# Patient Record
Sex: FEMALE | Race: WHITE | NOT HISPANIC OR LATINO | Employment: OTHER | URBAN - METROPOLITAN AREA
[De-identification: names, ages, dates, MRNs, and addresses within clinical notes are randomized per-mention and may not be internally consistent; named-entity substitution may affect disease eponyms.]

---
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
## 2021-04-17 ENCOUNTER — HEALTH MAINTENANCE LETTER (OUTPATIENT)
Age: 21
End: 2021-04-17

## 2021-10-02 ENCOUNTER — HEALTH MAINTENANCE LETTER (OUTPATIENT)
Age: 21
End: 2021-10-02

## 2022-01-13 ENCOUNTER — OFFICE VISIT (OUTPATIENT)
Dept: FAMILY MEDICINE | Facility: CLINIC | Age: 22
End: 2022-01-13
Payer: COMMERCIAL

## 2022-01-13 VITALS
HEART RATE: 67 BPM | DIASTOLIC BLOOD PRESSURE: 76 MMHG | TEMPERATURE: 97.6 F | BODY MASS INDEX: 30.4 KG/M2 | WEIGHT: 161 LBS | SYSTOLIC BLOOD PRESSURE: 120 MMHG | RESPIRATION RATE: 12 BRPM | OXYGEN SATURATION: 99 % | HEIGHT: 61 IN

## 2022-01-13 DIAGNOSIS — R59.0 PREAURICULAR ADENOPATHY: Primary | ICD-10-CM

## 2022-01-13 DIAGNOSIS — Z23 HIGH PRIORITY FOR 2019-NCOV VACCINE: ICD-10-CM

## 2022-01-13 PROCEDURE — 0064A COVID-19,PF,MODERNA (18+ YRS BOOSTER .25ML): CPT | Performed by: NURSE PRACTITIONER

## 2022-01-13 PROCEDURE — 91306 COVID-19,PF,MODERNA (18+ YRS BOOSTER .25ML): CPT | Performed by: NURSE PRACTITIONER

## 2022-01-13 PROCEDURE — 99203 OFFICE O/P NEW LOW 30 MIN: CPT | Mod: 25 | Performed by: NURSE PRACTITIONER

## 2022-01-13 ASSESSMENT — MIFFLIN-ST. JEOR: SCORE: 1432.67

## 2022-01-13 NOTE — PROGRESS NOTES
"  Assessment & Plan     Preauricular adenopathy  Left side preauricular nodule. Present >2 months.  Etiology unclear.  Lymph node vs salivary stone vs cyst vs other.  Recommend imaging.  Further plan pending results.  - US Head Neck Soft Tissue; Future    High priority for 2019-nCoV vaccine  - COVID-19,PF,MODERNA (18+ Yrs BOOSTER .25mL)    The risks, benefits and treatment options of prescribed medications or other treatments have been discussed with the patient. The patient verbalized their understanding and should call or follow up if no improvement or if they develop further problems.    Gemini ALEX Hernandez CNP  [facility]          Subjective   Gemini is a 21 year old who presents for the following health issues     HPI     Concern - lump on left side of face  Onset: 2 months  Description: patient reports a lump on the left side of face , right by ear.  Not painful, no change in size since she noticed it  Intensity: mild  Progression of Symptoms:  same  Accompanying Signs & Symptoms: patient reports jaw pain on and off for quite a while  Previous history of similar problem: no  Precipitating factors:        Worsened by: unknown  Alleviating factors:        Improved by: nothing  Therapies tried and outcome:  none         Review of Systems   Constitutional, HEENT, cardiovascular, pulmonary, gi and gu systems are negative, except as otherwise noted.      Objective    /76 (BP Location: Right arm, Cuff Size: Adult Large)   Pulse 67   Temp 97.6  F (36.4  C) (Tympanic)   Resp 12   Ht 1.549 m (5' 1\")   Wt 73 kg (161 lb)   LMP  (LMP Unknown)   SpO2 99%   BMI 30.42 kg/m    Body mass index is 30.42 kg/m .  Physical Exam   GENERAL: healthy, alert and no distress  HENT: normal cephalic/atraumatic, ear canals and TM's normal. No tenderness to the TMJs. No audible clicking with jaw movement. One 1-2mm round hard mobile nodule in front of the left ear.  NECK: no adenopathy, no asymmetry, " masses, or scars and thyroid normal to palpation

## 2022-01-18 ENCOUNTER — HOSPITAL ENCOUNTER (OUTPATIENT)
Dept: ULTRASOUND IMAGING | Facility: CLINIC | Age: 22
Discharge: HOME | End: 2022-01-18
Attending: NURSE PRACTITIONER | Admitting: NURSE PRACTITIONER
Payer: COMMERCIAL

## 2022-01-18 DIAGNOSIS — R59.0 PREAURICULAR ADENOPATHY: ICD-10-CM

## 2022-01-18 PROCEDURE — 76536 US EXAM OF HEAD AND NECK: CPT

## 2022-05-14 ENCOUNTER — HEALTH MAINTENANCE LETTER (OUTPATIENT)
Age: 22
End: 2022-05-14

## 2022-05-31 ENCOUNTER — VIRTUAL VISIT (OUTPATIENT)
Dept: FAMILY MEDICINE | Facility: CLINIC | Age: 22
End: 2022-05-31
Payer: COMMERCIAL

## 2022-05-31 DIAGNOSIS — B34.9 VIRAL ILLNESS: ICD-10-CM

## 2022-05-31 DIAGNOSIS — R05.9 COUGH: Primary | ICD-10-CM

## 2022-05-31 PROCEDURE — 99213 OFFICE O/P EST LOW 20 MIN: CPT | Mod: CS | Performed by: NURSE PRACTITIONER

## 2022-05-31 NOTE — PROGRESS NOTES
"Gemini is a 22 year old who is being evaluated via a billable telephone visit.      What phone number would you like to be contacted at? [phone]  How would you like to obtain your AVS? MyChart    Assessment & Plan     Gemini was seen today for sinus problem.    Diagnoses and all orders for this visit:    Viral illness  Cough  Patient education completed regarding viral cause, typical course, symptomatic treatment and when to follow-up.   Recommended sinus saline rinses twice daily, Flonase, decongestant and increase in fluids/rest.    Follow-up with no improvement or worsening of symptoms.   Recommend re-testing of COVID-19 to rule out COVID-19 virus.    -     Symptomatic; Yes; 5/28/2022 COVID-19 Virus (Coronavirus) by PCR; Future        BMI:   Estimated body mass index is 30.42 kg/m  as calculated from the following:    Height as of 1/13/22: 1.549 m (5' 1\").    Weight as of 1/13/22: 73 kg (161 lb).     Return in about 10 days (around 6/10/2022) for No improvement or sooner with worsening symptoms.    Kathrin Robledo, ALEX CNP  [facility] [place]          Subjective      Gemini is a 22 year old who presents for the following health issues      Sinus Problem     History of Present Illness     Patient reports onset of acute sore throat, congestion, headache, body ache, fatigue on Saturday.  Worsening of symptoms.  Today congestion is more present, now with productive cough. Cough has been minimal.  Sore throat has improved.  Continues with fatigue and nasal congestion.  Is not sleeping well due to nasal congestion and post-nasal drip.      Has tried Flonase, cough syrup and hot showers and Ibuprofen.      No one else sick at home.      Did COVID-19 test at home on Saturday - negative, has not repeated test.      She eats 2-3 servings of fruits and vegetables daily.She consumes 0 sweetened beverage(s) daily.She exercises with enough effort to increase her heart rate 30 to 60 minutes per day.  " She exercises with enough effort to increase her heart rate 5 days per week.   She is taking medications regularly.      Review of Systems     Constitutional, HEENT, cardiovascular, pulmonary, gi and gu systems are negative, except as otherwise noted.      Objective           Vitals:  No vitals were obtained today due to virtual visit.    Physical Exam   General:  healthy, alert and no distress  PSYCH: Alert and oriented times 3; coherent speech, normal   rate and volume, able to articulate logical thoughts, able   to abstract reason, no tangential thoughts, no hallucinations   or delusions  Her affect is normal  RESP: No cough, no audible wheezing, able to talk in full sentences  Remainder of exam unable to be completed due to telephone visits        Phone call duration: 10 minutes    4:22 p.m. to 4:32 p.m.

## 2022-06-01 ENCOUNTER — LAB (OUTPATIENT)
Dept: FAMILY MEDICINE | Facility: CLINIC | Age: 22
End: 2022-06-01
Attending: NURSE PRACTITIONER
Payer: COMMERCIAL

## 2022-06-01 DIAGNOSIS — R05.9 COUGH: ICD-10-CM

## 2022-06-01 PROCEDURE — U0003 INFECTIOUS AGENT DETECTION BY NUCLEIC ACID (DNA OR RNA); SEVERE ACUTE RESPIRATORY SYNDROME CORONAVIRUS 2 (SARS-COV-2) (CORONAVIRUS DISEASE [COVID-19]), AMPLIFIED PROBE TECHNIQUE, MAKING USE OF HIGH THROUGHPUT TECHNOLOGIES AS DESCRIBED BY CMS-2020-01-R: HCPCS

## 2022-06-01 PROCEDURE — U0005 INFEC AGEN DETEC AMPLI PROBE: HCPCS

## 2022-06-02 LAB — SARS-COV-2 RNA RESP QL NAA+PROBE: NEGATIVE

## 2022-06-02 NOTE — RESULT ENCOUNTER NOTE
Dear Gemini,     Reassuring negative COVID-19 test.  I hope you are feeling better!    Please send a [employer] message or call [phone]  if you have any questions.      ALEX Sweet, [facility]    If you have further questions about the interpretation of your labs, [email or web address] is a good website to check out for further information.

## 2022-09-03 ENCOUNTER — HEALTH MAINTENANCE LETTER (OUTPATIENT)
Age: 22
End: 2022-09-03

## 2022-10-10 ENCOUNTER — OFFICE VISIT (OUTPATIENT)
Dept: FAMILY MEDICINE | Facility: CLINIC | Age: 22
End: 2022-10-10
Payer: COMMERCIAL

## 2022-10-10 ENCOUNTER — ANCILLARY PROCEDURE (OUTPATIENT)
Dept: GENERAL RADIOLOGY | Facility: CLINIC | Age: 22
End: 2022-10-10
Attending: NURSE PRACTITIONER
Payer: COMMERCIAL

## 2022-10-10 VITALS
OXYGEN SATURATION: 99 % | SYSTOLIC BLOOD PRESSURE: 116 MMHG | HEIGHT: 61 IN | BODY MASS INDEX: 30.61 KG/M2 | DIASTOLIC BLOOD PRESSURE: 72 MMHG | HEART RATE: 64 BPM | RESPIRATION RATE: 20 BRPM | WEIGHT: 162.1 LBS | TEMPERATURE: 98.3 F

## 2022-10-10 DIAGNOSIS — R59.0 PREAURICULAR ADENOPATHY: Primary | ICD-10-CM

## 2022-10-10 DIAGNOSIS — M41.119 JUVENILE IDIOPATHIC SCOLIOSIS, UNSPECIFIED SPINAL REGION: ICD-10-CM

## 2022-10-10 DIAGNOSIS — R59.0 PREAURICULAR ADENOPATHY: ICD-10-CM

## 2022-10-10 PROCEDURE — 72082 X-RAY EXAM ENTIRE SPI 2/3 VW: CPT | Mod: TC | Performed by: RADIOLOGY

## 2022-10-10 PROCEDURE — 99213 OFFICE O/P EST LOW 20 MIN: CPT | Performed by: NURSE PRACTITIONER

## 2022-10-10 ASSESSMENT — PAIN SCALES - GENERAL: PAINLEVEL: NO PAIN (0)

## 2022-10-10 NOTE — PROGRESS NOTES
Assessment & Plan     Preauricular adenopathy  Due for recheck.  - US Head Neck Soft Tissue; Future    Juvenile idiopathic scoliosis, unspecified spinal region  Patient request:  - XR Spine Complete Scoliosis 2 Views; Future    The risks, benefits and treatment options of prescribed medications or other treatments have been discussed with the patient. The patient verbalized their understanding and should call or follow up if no improvement or if they develop further problems.    Gemini ALEX Hernandez CNP  [facility]            Subjective   Gemini is a 22 year old, presenting for the following health issues:  Results (Follow up periauricular nodule left, no change since last visit 1/2022, did notice an increase in size during covid infection.  US done 1/18/22.) and Health Maintenance (Reminded due for preventive visit with pap and vaccines, declines vaccines today, will schedule another appt.)      History of Present Illness       Reason for visit:  Follow up on lump in front of ear    She eats 2-3 servings of fruits and vegetables daily.She consumes 0 sweetened beverage(s) daily.She exercises with enough effort to increase her heart rate 30 to 60 minutes per day.  She exercises with enough effort to increase her heart rate 5 days per week.   She is taking medications regularly.       Chief Complaint   Patient presents with     Results     Follow up periauricular nodule left, no change since last visit 1/2022, did notice an increase in size during covid infection.  US done 1/18/22.     Health Maintenance     Reminded due for preventive visit with pap and vaccines, declines vaccines today, will schedule another appt.     Radiology recommended a 6 month recheck for stability        She would also like new scoliosis films  Wants to know if anything is worsening.  No worsening of symptoms.  [facility] films done at [facility].         Review of Systems   Constitutional, HEENT, cardiovascular,  "pulmonary, gi and gu systems are negative, except as otherwise noted.      Objective    /72 (BP Location: Right arm, Patient Position: Chair, Cuff Size: Adult Regular)   Pulse 64   Temp 98.3  F (36.8  C) (Tympanic)   Resp 20   Ht 1.549 m (5' 1\")   Wt 73.5 kg (162 lb 1.6 oz)   SpO2 99%   BMI 30.63 kg/m    Body mass index is 30.63 kg/m .  Physical Exam   GENERAL: healthy, alert and no distress  HENT: ear canals and TM's normal, nose and mouth without ulcers or lesions. Left preauricular nodule again noted - mobile, nontender, semifirm                    "

## 2022-10-18 ENCOUNTER — HOSPITAL ENCOUNTER (OUTPATIENT)
Dept: ULTRASOUND IMAGING | Facility: CLINIC | Age: 22
Discharge: HOME | End: 2022-10-18
Attending: NURSE PRACTITIONER | Admitting: NURSE PRACTITIONER
Payer: COMMERCIAL

## 2022-10-18 DIAGNOSIS — R59.0 PREAURICULAR ADENOPATHY: ICD-10-CM

## 2022-10-18 PROCEDURE — 76536 US EXAM OF HEAD AND NECK: CPT

## 2022-10-19 ENCOUNTER — TELEPHONE (OUTPATIENT)
Dept: OTOLARYNGOLOGY | Facility: CLINIC | Age: 22
End: 2022-10-19

## 2022-10-19 DIAGNOSIS — R59.0 PREAURICULAR LYMPHADENOPATHY: Primary | ICD-10-CM

## 2022-10-19 NOTE — TELEPHONE ENCOUNTER
[facility] Call Center    Phone Message    May a detailed message be left on voicemail: no     Reason for Call: Appointment Intake    Referring Provider Name: Gemini Hernandez APRN CNP in [facility]  Diagnosis and/or Symptoms: Preauricular lymphadenopathy [R59.0]      Sending to clinic per protocols  Action Taken: Other: ENT    Travel Screening: Not Applicable

## 2022-10-20 NOTE — TELEPHONE ENCOUNTER
LVM to schedule from referral    Patient can see Yumiko ADAMS at [facility] in return spot    Or      Community provider at next available new      [facility] call center number

## 2022-10-24 ENCOUNTER — TELEPHONE (OUTPATIENT)
Dept: OTOLARYNGOLOGY | Facility: CLINIC | Age: 22
End: 2022-10-24

## 2022-10-25 NOTE — TELEPHONE ENCOUNTER
FUTURE VISIT INFORMATION      FUTURE VISIT INFORMATION:    Date: 11/14/2022    Time: 1:40 PM     Location: [facility] ENT   REFERRAL INFORMATION:    Referring provider: ALEX Bates CNP     Referring providers clinic:  [facility]     Reason for visit/diagnosis  Preauricular lymphadenopathy     RECORDS REQUESTED FROM:       Clinic name Comments Records Status Imaging Status   [facility]  10/10/2022, 1/13/2022 Office visit with ALEX Bates CNP     10/18/2022  Head Neck  Epic  PACS

## 2022-11-04 ENCOUNTER — TELEPHONE (OUTPATIENT)
Dept: PEDIATRICS | Facility: CLINIC | Age: 22
End: 2022-11-04

## 2022-11-04 NOTE — LETTER
November 4, 2022    To  Gemini Serna  [address] [address]    Your team at [facility] cares about your health. We have reviewed your chart and based on our findings; we are making the following recommendations to better manage your health.     You are in particular need of attention regarding the following:     Schedule a primary care office visit with your provider for a Pap Smear to screen for Cervical Cancer.  PREVENTATIVE VISIT: Physical    If you have already completed these items, please contact the clinic via phone or   [employer] so your care team can review and update your records. Thank you for   choosing [facility] for your healthcare needs. For any questions,   concerns, or to schedule an appointment please contact our clinic.    Healthy Regards,      Your [facility] Care Team

## 2022-11-04 NOTE — TELEPHONE ENCOUNTER
Patient Quality Outreach    Patient is due for the following:   Cervical Cancer Screening - PAP Needed  Physical Preventive Adult Physical      Topic Date Due     COVID-19 Vaccine (4 - Booster for Moderna series) 03/10/2022     Diptheria Tetanus Pertussis (DTAP/TDAP/TD) Vaccine (7 - Td or Tdap) 08/16/2022     Flu Vaccine (1) 09/01/2022       Next Steps:   Schedule a Adult Preventative    Type of outreach:    Sent letter.      Questions for provider review:    None     Alesia Ibrahim, CMA

## 2022-11-14 ENCOUNTER — PRE VISIT (OUTPATIENT)
Dept: OTOLARYNGOLOGY | Facility: CLINIC | Age: 22
End: 2022-11-14

## 2022-11-14 ENCOUNTER — OFFICE VISIT (OUTPATIENT)
Dept: OTOLARYNGOLOGY | Facility: CLINIC | Age: 22
End: 2022-11-14
Attending: NURSE PRACTITIONER
Payer: COMMERCIAL

## 2022-11-14 VITALS
TEMPERATURE: 98.1 F | BODY MASS INDEX: 30.21 KG/M2 | OXYGEN SATURATION: 95 % | HEART RATE: 69 BPM | DIASTOLIC BLOOD PRESSURE: 68 MMHG | WEIGHT: 160 LBS | HEIGHT: 61 IN | SYSTOLIC BLOOD PRESSURE: 121 MMHG

## 2022-11-14 DIAGNOSIS — R59.0 PREAURICULAR LYMPHADENOPATHY: ICD-10-CM

## 2022-11-14 PROCEDURE — 99202 OFFICE O/P NEW SF 15 MIN: CPT | Performed by: STUDENT IN AN ORGANIZED HEALTH CARE EDUCATION/TRAINING PROGRAM

## 2022-11-14 ASSESSMENT — PAIN SCALES - GENERAL: PAINLEVEL: NO PAIN (0)

## 2022-11-14 NOTE — PROGRESS NOTES
November 14, 2022      Gemini Hernandez, CNP   [facility]   [address], [place] [address]       Dear Ms. Hernandez,     I had the pleasure of meeting Ms. Serna today in clinic.    HISTORY OF PRESENT ILLNESS:  As you know, she is a pleasant 22-year-old female referred for evaluation of a left preauricular lymph node.  She says this has been present for at least a year.  She has not noticed any change in size.  She has no symptoms related to it.  She has no constitutional symptoms.  She has had two ultrasounds of this.  The most recent ultrasound on 10/18/2022, described as a 9 x 4 x 5 mm left preauricular lymph node with oval appearance, normal morphology and a fatty hilum present.    PAST MEDICAL HISTORY:  None.    PAST SURGICAL HISTORY:  None.    MEDICATIONS:  None.    ALLERGIES:    1) PENICILLIN.    2) ZITHROMAX.    SOCIAL HISTORY:  She is a never smoker, nondrinker.  She does not use drugs.    FAMILY HISTORY:  None.    REVIEW OF SYSTEMS:  A 10-point review of system was performed, negative aside from that in the HPI.    PHYSICAL EXAMINATION:  On examination, she is alert, in no acute distress.  She has a soft, mobile 5-6 mm mass in the left preauricular region.  There is no other lymphadenopathy appreciated.  No lesions are seen in the oral cavity or oropharynx.    ASSESSMENT AND PLAN:  A 22-year-old woman with a benign lymph node in the preauricular area.  I reviewed the ultrasound with her today.  In particular, we highlighted the normal morphology and the presence of a fatty hilum.  With these two components of the appearance, this is most certainly a benign lymph node and no further workup or imaging is necessary.  She can follow up with me as needed.    Thank you for allowing me to participate in the care of this patient. If you have any further questions, please do not hesitate to contact me.      Sincerely,      Ernesto Guidry M.D.      Head and  Neck Surgical Oncology and Microvascular Reconstruction  Department of Otolaryngology - Head and Neck Surgery  [facility]        20 minutes spent on the date of the encounter in chart review, patient visit, review of tests, documentation and/or discussion with other providers about the issues documented above.

## 2022-11-14 NOTE — NURSING NOTE
"Chief Complaint   Patient presents with     Consult     Preauricular lymphadonopathy      Blood pressure 121/68, pulse 69, temperature 98.1  F (36.7  C), height 1.549 m (5' 1\"), weight 72.6 kg (160 lb), SpO2 95 %, not currently breastfeeding.    Marcio Wellington LPN    "

## 2022-11-14 NOTE — LETTER
11/14/2022       RE: Gemini Serna  [address] [address]     Dear Colleague,    Thank you for referring your patient, Gemini Serna, to the [facility] EAR NOSE AND [facility]IC [place] at [facility]. Please see a copy of my visit note below.    November 14, 2022      Gemini Hernandez CNP   [facility]   [address], [place] [address]       Dear Ms. Villanuevaradha,     I had the pleasure of meeting Ms. Serna today in clinic.    HISTORY OF PRESENT ILLNESS:  As you know, she is a pleasant 22-year-old female referred for evaluation of a left preauricular lymph node.  She says this has been present for at least a year.  She has not noticed any change in size.  She has no symptoms related to it.  She has no constitutional symptoms.  She has had two ultrasounds of this.  The most recent ultrasound on 10/18/2022, described as a 9 x 4 x 5 mm left preauricular lymph node with oval appearance, normal morphology and a fatty hilum present.    PAST MEDICAL HISTORY:  None.    PAST SURGICAL HISTORY:  None.    MEDICATIONS:  None.    ALLERGIES:    1) PENICILLIN.    2) ZITHROMAX.    SOCIAL HISTORY:  She is a never smoker, nondrinker.  She does not use drugs.    FAMILY HISTORY:  None.    REVIEW OF SYSTEMS:  A 10-point review of system was performed, negative aside from that in the HPI.    PHYSICAL EXAMINATION:  On examination, she is alert, in no acute distress.  She has a soft, mobile 5-6 mm mass in the left preauricular region.  There is no other lymphadenopathy appreciated.  No lesions are seen in the oral cavity or oropharynx.    ASSESSMENT AND PLAN:  A 22-year-old woman with a benign lymph node in the preauricular area.  I reviewed the ultrasound with her today.  In particular, we highlighted the normal morphology and the presence of a fatty hilum.  With these two components of the appearance, this  is most certainly a benign lymph node and no further workup or imaging is necessary.  She can follow up with me as needed.    Thank you for allowing me to participate in the care of this patient. If you have any further questions, please do not hesitate to contact me.      Sincerely,      Ernesto Guidry M.D.      Head and Neck Surgical Oncology and Microvascular Reconstruction  Department of Otolaryngology - Head and Neck Surgery  [facility]        20 minutes spent on the date of the encounter in chart review, patient visit, review of tests, documentation and/or discussion with other providers about the issues documented above.

## 2022-12-05 ENCOUNTER — MYC MEDICAL ADVICE (OUTPATIENT)
Dept: FAMILY MEDICINE | Facility: CLINIC | Age: 22
End: 2022-12-05

## 2022-12-06 NOTE — TELEPHONE ENCOUNTER
Routed to provider.  Please see MyChart message from pt asking for x ray orders for scoliosis.  Pt wants completed at [facility].  Will need order faxed.  Sylwia Yuan RN

## 2022-12-07 NOTE — TELEPHONE ENCOUNTER
Patient should ask her spine specialist for the xrays so that the proper films are ordered  Gemini Hernandez CNP

## 2022-12-08 NOTE — TELEPHONE ENCOUNTER
Pt mom called. Stated that they are unable to obtain the xrays due to patient age.  Xrays were done at [facility] and she is now over 18 years old.  Please call patient's mom and advise.

## 2022-12-12 ENCOUNTER — TELEPHONE (OUTPATIENT)
Dept: PEDIATRICS | Facility: CLINIC | Age: 22
End: 2022-12-12

## 2022-12-12 NOTE — TELEPHONE ENCOUNTER
Please also refer to My Chart note dated December 5.  In that note patient stated she was preparing for surgery for her scoliosis.  My response was, that if she is preparing for surgery and needs specific x-rays, she should get orders from her surgeon so that the correct films get ordered.  In addition, the results of the x-rays would go directly to her surgeon.    Gemini Hernandez, CNP

## 2022-12-12 NOTE — TELEPHONE ENCOUNTER
Mother called to say they need new orders for scoliosis XRAYs to be done at [facility]. They could not get the imaging done at [facility] as she is no longer a child.Laurie Mckeon RN

## 2022-12-13 NOTE — TELEPHONE ENCOUNTER
MyChart sent per provider's recommendation-and please see similar response in TE, where patient/mother were called as well.    Thais Fan RN  [facility]

## 2022-12-13 NOTE — TELEPHONE ENCOUNTER
Patient was instructed to return call to [facility] main line at [phone] to speak with an RN.  Semi-detailed voicemail left on both patient's mobile and home numbers listed. [employer] message sent in other [employer] encounter as well.     Thais Fan RN  [facility]

## 2023-03-22 ENCOUNTER — HOSPITAL ENCOUNTER (OUTPATIENT)
Dept: MRI IMAGING | Facility: CLINIC | Age: 23
Discharge: HOME | End: 2023-03-22
Payer: COMMERCIAL

## 2023-03-22 DIAGNOSIS — M41.125 ADOLESCENT IDIOPATHIC SCOLIOSIS OF THORACOLUMBAR REGION: ICD-10-CM

## 2023-03-22 PROCEDURE — 72141 MRI NECK SPINE W/O DYE: CPT

## 2023-03-22 PROCEDURE — 72148 MRI LUMBAR SPINE W/O DYE: CPT

## 2023-03-22 PROCEDURE — 72146 MRI CHEST SPINE W/O DYE: CPT

## 2023-06-02 ENCOUNTER — HEALTH MAINTENANCE LETTER (OUTPATIENT)
Age: 23
End: 2023-06-02

## 2023-06-05 ENCOUNTER — TELEPHONE (OUTPATIENT)
Dept: FAMILY MEDICINE | Facility: CLINIC | Age: 23
End: 2023-06-05
Payer: COMMERCIAL

## 2023-06-05 NOTE — TELEPHONE ENCOUNTER
Patient Quality Outreach    Patient is due for the following:   Cervical Cancer Screening - PAP Needed  Physical Preventive Adult Physical    Next Steps:   Schedule a Adult Preventative    Type of outreach:    Sent [employer] message.      Questions for provider review:    None           Javy Omer, CMA

## 2023-10-02 ENCOUNTER — TELEPHONE (OUTPATIENT)
Dept: FAMILY MEDICINE | Facility: CLINIC | Age: 23
End: 2023-10-02
Payer: COMMERCIAL

## 2023-10-02 NOTE — TELEPHONE ENCOUNTER
Patient Quality Outreach    Patient is due for the following:   Cervical Cancer Screening - PAP Needed  Physical     Next Steps:   Schedule a Adult Preventative    Type of outreach:    Sent MyChart message. and Sent letter.      Questions for provider review:    None           Javy Omer, CMA

## 2023-11-08 ENCOUNTER — TELEPHONE (OUTPATIENT)
Dept: FAMILY MEDICINE | Facility: CLINIC | Age: 23
End: 2023-11-08
Payer: COMMERCIAL

## 2023-11-08 DIAGNOSIS — Z01.818 PRE-OP EVALUATION: Primary | ICD-10-CM

## 2023-11-08 NOTE — TELEPHONE ENCOUNTER
General Call      Reason for Call:  Pts mother called regarding pre op visit scheduled 11/20. Mother states surgery team is requesting labs, EKG, and a pulmonary function test prior to surgery. Mother wants to know if PCP puts orders in for this testing or how to proceed. Please advise.       Could we send this information to you in [employer] or would you prefer to receive a phone call?:   Patient would prefer a phone call   Okay to leave a detailed message?: Yes at Home number on file [phone] (home)

## 2023-11-09 NOTE — TELEPHONE ENCOUNTER
See note below. Was not able to reach pt's mother at either number. Called pt to discuss. She's advised that labs and EKG can be done at her visit, and that PCP can also order the PFT. Pt is having a spinal infusion on 12/11/23 in [place]. Would PCP like to proceed with ordering PFT now, so pt can get it scheduled? Order pended for PCP review.    Savanah Corbett RN  [facility]

## 2024-03-10 ENCOUNTER — TELEPHONE (OUTPATIENT)
Dept: FAMILY MEDICINE | Facility: CLINIC | Age: 24
End: 2024-03-10
Payer: COMMERCIAL

## 2024-03-10 NOTE — TELEPHONE ENCOUNTER
[facility] - Orthopedic preop form (Dr. Bennett Morgan) routed to Gemini Hernandez to hold for 3/19/24 preop appt.

## 2024-03-13 ENCOUNTER — TELEPHONE (OUTPATIENT)
Dept: FAMILY MEDICINE | Facility: CLINIC | Age: 24
End: 2024-03-13
Payer: COMMERCIAL

## 2024-03-13 NOTE — TELEPHONE ENCOUNTER
Per chart review, patient has appointment on 3/19/24 for pre-op, including EKG and labs.   Call returned to patient, who was notified these will be completed at appointment.  Understanding voiced.    Thais Fan RN  [facility]

## 2024-03-13 NOTE — TELEPHONE ENCOUNTER
Order/Referral Request    Who is requesting: pt. mom    Orders being requested: EKG    Reason service is needed/diagnosis: Pt. Mom said that there was supposed to be orders put in for EKG    When are orders needed by: asap    Has this been discussed with Provider: Yes    Does patient have a preference on a Group/Provider/Facility? N/a    Does patient have an appointment scheduled?: No    Where to send orders: Place orders within Epic    Could we [facility] this information to you [facility] or would you prefer to receive a phone call?:   Patient would prefer a phone call   Okay to leave a detailed message?: Yes at Cell number on file:    Telephone Information:   Mobile [phone]

## 2024-03-19 ENCOUNTER — OFFICE VISIT (OUTPATIENT)
Dept: FAMILY MEDICINE | Facility: CLINIC | Age: 24
End: 2024-03-19
Payer: COMMERCIAL

## 2024-03-19 VITALS
HEART RATE: 82 BPM | SYSTOLIC BLOOD PRESSURE: 116 MMHG | WEIGHT: 154 LBS | BODY MASS INDEX: 29.07 KG/M2 | HEIGHT: 61 IN | RESPIRATION RATE: 14 BRPM | OXYGEN SATURATION: 99 % | TEMPERATURE: 98.4 F | DIASTOLIC BLOOD PRESSURE: 74 MMHG

## 2024-03-19 DIAGNOSIS — Z01.818 PRE-OP EVALUATION: Primary | ICD-10-CM

## 2024-03-19 DIAGNOSIS — M41.115 JUVENILE IDIOPATHIC SCOLIOSIS OF THORACOLUMBAR REGION: ICD-10-CM

## 2024-03-19 LAB
ALBUMIN SERPL BCG-MCNC: 4.5 G/DL (ref 3.5–5.2)
ALBUMIN UR-MCNC: NEGATIVE MG/DL
ALP SERPL-CCNC: 66 U/L (ref 40–150)
ALT SERPL W P-5'-P-CCNC: 15 U/L (ref 0–50)
ANION GAP SERPL CALCULATED.3IONS-SCNC: 10 MMOL/L (ref 7–15)
APPEARANCE UR: CLEAR
APTT PPP: 32 SECONDS (ref 22–38)
AST SERPL W P-5'-P-CCNC: 20 U/L (ref 0–45)
BASOPHILS # BLD AUTO: 0 10E3/UL (ref 0–0.2)
BASOPHILS NFR BLD AUTO: 1 %
BILIRUB DIRECT SERPL-MCNC: <0.2 MG/DL (ref 0–0.3)
BILIRUB SERPL-MCNC: 0.4 MG/DL
BILIRUB UR QL STRIP: NEGATIVE
BUN SERPL-MCNC: 8.8 MG/DL (ref 6–20)
CALCIUM SERPL-MCNC: 9.5 MG/DL (ref 8.6–10)
CHLORIDE SERPL-SCNC: 102 MMOL/L (ref 98–107)
COLOR UR AUTO: YELLOW
CREAT SERPL-MCNC: 0.75 MG/DL (ref 0.51–0.95)
DEPRECATED HCO3 PLAS-SCNC: 26 MMOL/L (ref 22–29)
EGFRCR SERPLBLD CKD-EPI 2021: >90 ML/MIN/1.73M2
EOSINOPHIL # BLD AUTO: 0.2 10E3/UL (ref 0–0.7)
EOSINOPHIL NFR BLD AUTO: 2 %
ERYTHROCYTE [DISTWIDTH] IN BLOOD BY AUTOMATED COUNT: 12.5 % (ref 10–15)
GLUCOSE SERPL-MCNC: 89 MG/DL (ref 70–99)
GLUCOSE UR STRIP-MCNC: NEGATIVE MG/DL
HCG UR QL: NEGATIVE
HCT VFR BLD AUTO: 39.4 % (ref 35–47)
HGB BLD-MCNC: 12.7 G/DL (ref 11.7–15.7)
HGB UR QL STRIP: NEGATIVE
IMM GRANULOCYTES # BLD: 0 10E3/UL
IMM GRANULOCYTES NFR BLD: 0 %
INR PPP: 1.07 (ref 0.85–1.15)
KETONES UR STRIP-MCNC: NEGATIVE MG/DL
LEUKOCYTE ESTERASE UR QL STRIP: NEGATIVE
LYMPHOCYTES # BLD AUTO: 2.9 10E3/UL (ref 0.8–5.3)
LYMPHOCYTES NFR BLD AUTO: 39 %
MCH RBC QN AUTO: 29 PG (ref 26.5–33)
MCHC RBC AUTO-ENTMCNC: 32.2 G/DL (ref 31.5–36.5)
MCV RBC AUTO: 90 FL (ref 78–100)
MONOCYTES # BLD AUTO: 0.6 10E3/UL (ref 0–1.3)
MONOCYTES NFR BLD AUTO: 8 %
NEUTROPHILS # BLD AUTO: 3.7 10E3/UL (ref 1.6–8.3)
NEUTROPHILS NFR BLD AUTO: 49 %
NITRATE UR QL: NEGATIVE
PH UR STRIP: 6 [PH] (ref 5–7)
PLATELET # BLD AUTO: 323 10E3/UL (ref 150–450)
POTASSIUM SERPL-SCNC: 3.5 MMOL/L (ref 3.4–5.3)
PROT SERPL-MCNC: 7.2 G/DL (ref 6.4–8.3)
RBC # BLD AUTO: 4.38 10E6/UL (ref 3.8–5.2)
SODIUM SERPL-SCNC: 138 MMOL/L (ref 135–145)
SP GR UR STRIP: <=1.005 (ref 1–1.03)
UROBILINOGEN UR STRIP-ACNC: 0.2 E.U./DL
WBC # BLD AUTO: 7.5 10E3/UL (ref 4–11)

## 2024-03-19 PROCEDURE — 81025 URINE PREGNANCY TEST: CPT | Performed by: NURSE PRACTITIONER

## 2024-03-19 PROCEDURE — 90471 IMMUNIZATION ADMIN: CPT | Performed by: NURSE PRACTITIONER

## 2024-03-19 PROCEDURE — 80053 COMPREHEN METABOLIC PANEL: CPT | Performed by: NURSE PRACTITIONER

## 2024-03-19 PROCEDURE — 81003 URINALYSIS AUTO W/O SCOPE: CPT | Performed by: NURSE PRACTITIONER

## 2024-03-19 PROCEDURE — 36415 COLL VENOUS BLD VENIPUNCTURE: CPT | Performed by: NURSE PRACTITIONER

## 2024-03-19 PROCEDURE — 82248 BILIRUBIN DIRECT: CPT | Performed by: NURSE PRACTITIONER

## 2024-03-19 PROCEDURE — 85025 COMPLETE CBC W/AUTO DIFF WBC: CPT | Performed by: NURSE PRACTITIONER

## 2024-03-19 PROCEDURE — 90715 TDAP VACCINE 7 YRS/> IM: CPT | Performed by: NURSE PRACTITIONER

## 2024-03-19 PROCEDURE — 99214 OFFICE O/P EST MOD 30 MIN: CPT | Mod: 25 | Performed by: NURSE PRACTITIONER

## 2024-03-19 PROCEDURE — 93000 ELECTROCARDIOGRAM COMPLETE: CPT | Performed by: NURSE PRACTITIONER

## 2024-03-19 PROCEDURE — 85730 THROMBOPLASTIN TIME PARTIAL: CPT | Performed by: NURSE PRACTITIONER

## 2024-03-19 PROCEDURE — 85610 PROTHROMBIN TIME: CPT | Performed by: NURSE PRACTITIONER

## 2024-03-19 ASSESSMENT — PAIN SCALES - GENERAL: PAINLEVEL: NO PAIN (1)

## 2024-03-19 NOTE — PROGRESS NOTES
Preoperative Evaluation  [facility]  [address] [address]  Phone: [phone]  Primary Provider: Olga Hernandez  Pre-op Performing Provider: OLGA HERNANDEZ  Mar 19, 2024       Olga is a 24 year old, presenting for the following:  Pre-Op Exam        3/19/2024     8:58 AM   Additional Questions   Roomed by Shawna BRADLEY   Accompanied by self         3/19/2024     8:58 AM   Patient Reported Additional Medications   Patient reports taking the following new medications no new meds     Surgical Information  Surgery/Procedure: FUSION SCOLIOSIS SPINE THORACO LUMBAR POSTERIOR T3 - L1, Left anterior scoliosis correction T12-L4  Surgery Location: [facility]  Surgeon: Bennett Price   Surgery Date: 04/15/24  Time of Surgery: TBD  Where patient plans to recover: At home with family  Fax number for surgical facility: [phone] attn Dr. Price/ [place]     Assessment & Plan     The proposed surgical procedure is considered INTERMEDIATE risk.    Pre-op evaluation  - Basic metabolic panel  (Ca, Cl, CO2, Creat, Gluc, K, Na, BUN); Future  - CBC with platelets and differential; Future  - INR; Future  - Partial thromboplastin time; Future  - UA Macroscopic with reflex to Microscopic and Culture - Lab Collect; Future  - Hepatic panel (Albumin, ALT, AST, Bili, Alk Phos, TP); Future  - EKG 12-lead complete w/read - Clinics  - HCG Qual, Urine ([id number]); Future  - Adult Pulmonary Medicine  Referral; Future    Juvenile idiopathic scoliosis of thoracolumbar region  - Basic metabolic panel  (Ca, Cl, CO2, Creat, Gluc, K, Na, BUN); Future  - CBC with platelets and differential; Future  - INR; Future  - Partial thromboplastin time; Future  - UA Macroscopic with reflex to Microscopic and Culture - Lab Collect; Future  - Hepatic panel (Albumin, ALT, AST, Bili, Alk Phos, TP); Future  - EKG 12-lead complete w/read -  Clinics  - HCG Qual, Urine ([id number]); Future  - Adult Pulmonary Medicine  Referral; Future    Work up required by surgeon was ordered.  PFTs are scheduled for tomorrow  Pulmonologist clearance is required by the surgeon as well - was ordered and is pending.        - No identified additional risk factors other than previously addressed    Antiplatelet or Anticoagulation Medication Instructions   - Patient is on no antiplatelet or anticoagulation medications.    Additional Medication Instructions  Patient is to take all scheduled medications on the day of surgery  Only taking Claritin    Recommendation  APPROVAL GIVEN to proceed with proposed procedure, without further diagnostic evaluation.                    Subjective       HPI related to upcoming procedure:   Patient has a history of scoliosis - more back pain as she is getting older        3/19/2024     9:08 AM   Preop Questions   1. Have you ever had a heart attack or stroke? No   2. Have you ever had surgery on your heart or blood vessels, such as a stent placement, a coronary artery bypass, or surgery on an artery in your head, neck, heart, or legs? No   3. Do you have chest pain with activity? No   4. Do you have a history of  heart failure? No   5. Do you currently have a cold, bronchitis or symptoms of other infection? No   6. Do you have a cough, shortness of breath, or wheezing? No   7. Do you or anyone in your family have previous history of blood clots? YES - mother has Factor V. Patient has been screened and was negative.   8. Do you or does anyone in your family have a serious bleeding problem such as prolonged bleeding following surgeries or cuts? No   9. Have you ever had problems with anemia or been told to take iron pills? No   10. Have you had any abnormal blood loss such as black, tarry or bloody stools, or abnormal vaginal bleeding? No   11. Have you ever had a blood transfusion? No   12. Are you willing to have a blood transfusion if  it is medically needed before, during, or after your surgery? Yes   13. Have you or any of your relatives ever had problems with anesthesia? No   14. Do you have sleep apnea, excessive snoring or daytime drowsiness? No   15. Do you have any artifical heart valves or other implanted medical devices like a pacemaker, defibrillator, or continuous glucose monitor? No   16. Do you have artificial joints? No   17. Are you allergic to latex? No   18. Is there any chance that you may be pregnant? No     Health Care Directive  Patient does not have a Health Care Directive or Living Will: Discussed advance care planning with patient; however, patient declined at this time.    Preoperative Review of    reviewed - no record of controlled substances prescribed.          Patient Active Problem List    Diagnosis Date Noted    Seasonal allergic rhinitis 03/22/2016     Priority: Medium    Scoliosis 08/28/2014     Priority: Medium     Followed at [facility] - will have spinal fusion in fall/winter 2014        No past medical history on file.  No past surgical history on file.  Current Outpatient Medications   Medication Sig Dispense Refill    IBUPROFEN CHILDRENS PO Take 200 mg by mouth as needed       loratadine (CLARITIN) 10 MG tablet Take 1 tablet (10 mg) by mouth daily (Patient taking differently: Take 10 mg by mouth as needed) 90 tablet 1    Multiple Vitamins-Minerals (MULTIVITAMIN GUMMIES ADULTS) CHEW Take 2 chew tab by mouth daily         Allergies   Allergen Reactions    Penicillins     Zithromax [Azithromycin Dihydrate]         Social History     Tobacco Use    Smoking status: Never    Smokeless tobacco: Never   Substance Use Topics    Alcohol use: No       History   Drug Use No         Review of Systems    Review of Systems  Constitutional, neuro, ENT, endocrine, pulmonary, cardiac, gastrointestinal, genitourinary, musculoskeletal, integument and psychiatric systems are negative, except as otherwise  "noted.    Objective    /74   Pulse 82   Temp 98.4  F (36.9  C) (Tympanic)   Resp 14   Ht 1.543 m (5' 0.75\")   Wt 69.9 kg (154 lb)   LMP 02/29/2024   SpO2 99%   BMI 29.34 kg/m     Estimated body mass index is 29.34 kg/m  as calculated from the following:    Height as of this encounter: 1.543 m (5' 0.75\").    Weight as of this encounter: 69.9 kg (154 lb).  Physical Exam  GENERAL: alert and no distress  EYES: Eyes grossly normal to inspection, PERRL and conjunctivae and sclerae normal  HENT: ear canals and TM's normal, nose and mouth without ulcers or lesions  NECK: no adenopathy, no asymmetry, masses, or scars  RESP: lungs clear to auscultation - no rales, rhonchi or wheezes  CV: regular rate and rhythm, normal S1 S2, no S3 or S4, no murmur, click or rub, no peripheral edema  ABDOMEN: soft, nontender, no hepatosplenomegaly, no masses and bowel sounds normal  MS: no gross musculoskeletal defects noted, no edema  SKIN: no suspicious lesions or rashes  NEURO: Normal strength and tone, mentation intact and speech normal  PSYCH: mentation appears normal, affect normal/bright  LYMPH: no cervical, supraclavicular, axillary, or inguinal adenopathy        Diagnostics  Recent Results (from the past 24 hour(s))   Basic metabolic panel  (Ca, Cl, CO2, Creat, Gluc, K, Na, BUN)    Collection Time: 03/19/24  9:56 AM   Result Value Ref Range    Sodium 138 135 - 145 mmol/L    Potassium 3.5 3.4 - 5.3 mmol/L    Chloride 102 98 - 107 mmol/L    Carbon Dioxide (CO2) 26 22 - 29 mmol/L    Anion Gap 10 7 - 15 mmol/L    Urea Nitrogen 8.8 6.0 - 20.0 mg/dL    Creatinine 0.75 0.51 - 0.95 mg/dL    GFR Estimate >90 >60 mL/min/1.73m2    Calcium 9.5 8.6 - 10.0 mg/dL    Glucose 89 70 - 99 mg/dL   INR    Collection Time: 03/19/24  9:56 AM   Result Value Ref Range    INR 1.07 0.85 - 1.15   Partial thromboplastin time    Collection Time: 03/19/24  9:56 AM   Result Value Ref Range    aPTT 32 22 - 38 Seconds   UA Macroscopic with reflex to " Microscopic and Culture - Lab Collect    Collection Time: 03/19/24  9:56 AM    Specimen: Urine, Clean Catch   Result Value Ref Range    Color Urine Yellow Colorless, Straw, Light Yellow, Yellow    Appearance Urine Clear Clear    Glucose Urine Negative Negative mg/dL    Bilirubin Urine Negative Negative    Ketones Urine Negative Negative mg/dL    Specific Gravity Urine <=1.005 1.003 - 1.035    Blood Urine Negative Negative    pH Urine 6.0 5.0 - 7.0    Protein Albumin Urine Negative Negative mg/dL    Urobilinogen Urine 0.2 0.2, 1.0 E.U./dL    Nitrite Urine Negative Negative    Leukocyte Esterase Urine Negative Negative   Hepatic panel (Albumin, ALT, AST, Bili, Alk Phos, TP)    Collection Time: 03/19/24  9:56 AM   Result Value Ref Range    Protein Total 7.2 6.4 - 8.3 g/dL    Albumin 4.5 3.5 - 5.2 g/dL    Bilirubin Total 0.4 <=1.2 mg/dL    Alkaline Phosphatase 66 40 - 150 U/L    AST 20 0 - 45 U/L    ALT 15 0 - 50 U/L    Bilirubin Direct <0.20 0.00 - 0.30 mg/dL   HCG Qual, Urine ([id number])    Collection Time: 03/19/24  9:56 AM   Result Value Ref Range    hCG Urine Qualitative Negative Negative   CBC with platelets and differential    Collection Time: 03/19/24  9:56 AM   Result Value Ref Range    WBC Count 7.5 4.0 - 11.0 10e3/uL    RBC Count 4.38 3.80 - 5.20 10e6/uL    Hemoglobin 12.7 11.7 - 15.7 g/dL    Hematocrit 39.4 35.0 - 47.0 %    MCV 90 78 - 100 fL    MCH 29.0 26.5 - 33.0 pg    MCHC 32.2 31.5 - 36.5 g/dL    RDW 12.5 10.0 - 15.0 %    Platelet Count 323 150 - 450 10e3/uL    % Neutrophils 49 %    % Lymphocytes 39 %    % Monocytes 8 %    % Eosinophils 2 %    % Basophils 1 %    % Immature Granulocytes 0 %    Absolute Neutrophils 3.7 1.6 - 8.3 10e3/uL    Absolute Lymphocytes 2.9 0.8 - 5.3 10e3/uL    Absolute Monocytes 0.6 0.0 - 1.3 10e3/uL    Absolute Eosinophils 0.2 0.0 - 0.7 10e3/uL    Absolute Basophils 0.0 0.0 - 0.2 10e3/uL    Absolute Immature Granulocytes 0.0 <=0.4 10e3/uL        EKG: appears normal, NSR, sinus  bradycardia, normal axis, normal intervals, no acute ST/T changes c/w ischemia, no LVH by voltage criteria, unchanged from previous tracings    Revised Cardiac Risk Index (RCRI)  The patient has the following serious cardiovascular risks for perioperative complications:   - No serious cardiac risks = 0 points     RCRI Interpretation: 0 points: Class I (very low risk - 0.4% complication rate)         Signed Electronically by: ALEX Bates CNP  Copy of this evaluation report is provided to requesting physician.

## 2024-03-19 NOTE — NURSING NOTE
Prior to immunization administration, verified patients identity using patient s name and date of birth. Please see Immunization Activity for additional information.     Screening Questionnaire for Adult Immunization    Are you sick today?   No   Do you have allergies to medications, food, a vaccine component or latex?   Penicillin, zithromax   Have you ever had a serious reaction after receiving a vaccination?   No   Do you have a long-term health problem with heart, lung, kidney, or metabolic disease (e.g., diabetes), asthma, a blood disorder, no spleen, complement component deficiency, a cochlear implant, or a spinal fluid leak?  Are you on long-term aspirin therapy?   No   Do you have cancer, leukemia, HIV/AIDS, or any other immune system problem?   No   Do you have a parent, brother, or sister with an immune system problem?   No   In the past 3 months, have you taken medications that affect  your immune system, such as prednisone, other steroids, or anticancer drugs; drugs for the treatment of rheumatoid arthritis, Crohn s disease, or psoriasis; or have you had radiation treatments?   No   Have you had a seizure, or a brain or other nervous system problem?   No   During the past year, have you received a transfusion of blood or blood    products, or been given immune (gamma) globulin or antiviral drug?   No   For women: Are you pregnant or is there a chance you could become       pregnant during the next month?   No   Have you received any vaccinations in the past 4 weeks?   No     Immunization questionnaire Pt due for TDAP.      Patient instructed to remain in clinic for 15 minutes afterwards, and to report any adverse reactions.     Screening performed by Leila Lynch CMA on 3/19/2024 at 9:37 AM.

## 2024-03-20 ENCOUNTER — TELEPHONE (OUTPATIENT)
Dept: FAMILY MEDICINE | Facility: CLINIC | Age: 24
End: 2024-03-20
Payer: COMMERCIAL

## 2024-03-20 ENCOUNTER — HOSPITAL ENCOUNTER (OUTPATIENT)
Dept: RESPIRATORY THERAPY | Facility: CLINIC | Age: 24
Discharge: HOME | End: 2024-03-20
Attending: NURSE PRACTITIONER | Admitting: NURSE PRACTITIONER
Payer: COMMERCIAL

## 2024-03-20 DIAGNOSIS — Z01.818 PRE-OP EVALUATION: ICD-10-CM

## 2024-03-20 PROCEDURE — 94729 DIFFUSING CAPACITY: CPT

## 2024-03-20 PROCEDURE — 94375 RESPIRATORY FLOW VOLUME LOOP: CPT

## 2024-03-20 PROCEDURE — 94726 PLETHYSMOGRAPHY LUNG VOLUMES: CPT

## 2024-03-20 NOTE — LETTER
March 20, 2024    To  Gemini Serna  [address] [address] [facility] [address]    Your team at [facility] cares about your health. We have reviewed your chart and based on our findings; we are making the following recommendations to better manage your health.     You are in particular need of attention regarding the following:     Schedule a primary care office visit with your provider for a Pap Smear to screen for Cervical Cancer.  PREVENTATIVE VISIT: Physical    If you have already completed these items, please contact the clinic via phone or   [employer] so your care team can review and update your records. Thank you for   choosing [facility] for your healthcare needs. For any questions,   concerns, or to schedule an appointment please contact our clinic.    Healthy Regards,      Your [facility] Care Team                    
[phone]

## 2024-03-20 NOTE — TELEPHONE ENCOUNTER
Patient Quality Outreach    Patient is due for the following:   Cervical Cancer Screening - PAP Needed  Physical Preventive Adult Physical  Chlamydia Screening    Next Steps:   Schedule a Adult Preventative    Type of outreach:    Sent letter.      Questions for provider review:    None           Alesia Ibrahim, CMA

## 2024-03-26 LAB
DLCOCOR-%PRED-PRE: 123 %
DLCOCOR-PRE: 25.28 ML/MIN/MMHG
DLCOUNC-%PRED-PRE: 120 %
DLCOUNC-PRE: 24.72 ML/MIN/MMHG
DLCOUNC-PRED: 20.46 ML/MIN/MMHG
ERV-%PRED-PRE: 59 %
ERV-PRE: 0.68 L
ERV-PRED: 1.13 L
EXPTIME-PRE: 4.94 SEC
FEF2575-%PRED-PRE: 97 %
FEF2575-PRE: 3.31 L/SEC
FEF2575-PRED: 3.39 L/SEC
FEFMAX-%PRED-PRE: 115 %
FEFMAX-PRE: 7.46 L/SEC
FEFMAX-PRED: 6.47 L/SEC
FEV1-%PRED-PRE: 97 %
FEV1-PRE: 2.71 L
FEV1FEV6-PRE: 88 %
FEV1FEV6-PRED: 86 %
FEV1FVC-PRE: 88 %
FEV1FVC-PRED: 88 %
FEV1SVC-PRE: 88 %
FEV1SVC-PRED: 81 %
FIFMAX-PRE: 5.36 L/SEC
FRCPLETH-%PRED-PRE: 95 %
FRCPLETH-PRE: 2.39 L
FRCPLETH-PRED: 2.5 L
FVC-%PRED-PRE: 97 %
FVC-PRE: 3.07 L
FVC-PRED: 3.15 L
IC-%PRED-PRE: 105 %
IC-PRE: 2.4 L
IC-PRED: 2.26 L
RVPLETH-%PRED-PRE: 142 %
RVPLETH-PRE: 1.72 L
RVPLETH-PRED: 1.2 L
TLCPLETH-%PRED-PRE: 107 %
TLCPLETH-PRE: 4.79 L
TLCPLETH-PRED: 4.44 L
VA-%PRED-PRE: 88 %
VA-PRE: 3.75 L
VC-%PRED-PRE: 89 %
VC-PRE: 3.07 L
VC-PRED: 3.42 L

## 2024-03-27 NOTE — NURSING NOTE
All information including pre op H&P; labs, EKG and pulmonary function testing faxed to Dr. Price at 1 [phone] today.

## 2024-06-04 ENCOUNTER — ANCILLARY PROCEDURE (OUTPATIENT)
Dept: GENERAL RADIOLOGY | Facility: CLINIC | Age: 24
End: 2024-06-04
Payer: COMMERCIAL

## 2024-06-04 DIAGNOSIS — M41.125 ADOLESCENT IDIOPATHIC SCOLIOSIS OF THORACOLUMBAR REGION: ICD-10-CM

## 2024-06-04 PROCEDURE — 72082 X-RAY EXAM ENTIRE SPI 2/3 VW: CPT | Performed by: STUDENT IN AN ORGANIZED HEALTH CARE EDUCATION/TRAINING PROGRAM

## 2024-06-04 PROCEDURE — 71046 X-RAY EXAM CHEST 2 VIEWS: CPT | Mod: GC | Performed by: RADIOLOGY

## 2024-07-06 ENCOUNTER — HEALTH MAINTENANCE LETTER (OUTPATIENT)
Age: 24
End: 2024-07-06

## 2024-07-24 ENCOUNTER — ANCILLARY PROCEDURE (OUTPATIENT)
Dept: GENERAL RADIOLOGY | Facility: CLINIC | Age: 24
End: 2024-07-24
Payer: COMMERCIAL

## 2024-07-24 DIAGNOSIS — M41.125 ADOLESCENT IDIOPATHIC SCOLIOSIS OF THORACOLUMBAR REGION: ICD-10-CM

## 2024-07-24 PROCEDURE — 72082 X-RAY EXAM ENTIRE SPI 2/3 VW: CPT | Performed by: STUDENT IN AN ORGANIZED HEALTH CARE EDUCATION/TRAINING PROGRAM

## 2024-08-02 ENCOUNTER — TRANSFERRED RECORDS (OUTPATIENT)
Dept: HEALTH INFORMATION MANAGEMENT | Facility: CLINIC | Age: 24
End: 2024-08-02
Payer: COMMERCIAL

## 2024-09-16 ENCOUNTER — TELEPHONE (OUTPATIENT)
Dept: FAMILY MEDICINE | Facility: CLINIC | Age: 24
End: 2024-09-16
Payer: COMMERCIAL

## 2024-09-16 NOTE — TELEPHONE ENCOUNTER
Patient Quality Outreach    Patient is due for the following:   Physical Preventive Adult Physical    Next Steps:   Schedule a Adult Preventative    Type of outreach:    Sent [employer] message.      Questions for provider review:    None           Javy Omer, CMA

## 2024-11-21 ENCOUNTER — TRANSCRIBE ORDERS (OUTPATIENT)
Dept: OTHER | Age: 24
End: 2024-11-21

## 2024-11-21 DIAGNOSIS — M41.125 ADOLESCENT IDIOPATHIC SCOLIOSIS OF THORACOLUMBAR REGION: Primary | ICD-10-CM

## 2024-11-22 PROBLEM — M41.125 ADOLESCENT IDIOPATHIC SCOLIOSIS OF THORACOLUMBAR REGION: Status: ACTIVE | Noted: 2024-11-22

## 2024-12-04 ENCOUNTER — THERAPY VISIT (OUTPATIENT)
Dept: PHYSICAL THERAPY | Facility: CLINIC | Age: 24
End: 2024-12-04
Attending: ORTHOPAEDIC SURGERY
Payer: COMMERCIAL

## 2024-12-04 DIAGNOSIS — M41.125 ADOLESCENT IDIOPATHIC SCOLIOSIS OF THORACOLUMBAR REGION: Primary | ICD-10-CM

## 2024-12-04 PROCEDURE — 97110 THERAPEUTIC EXERCISES: CPT | Mod: GP | Performed by: PHYSICAL THERAPIST

## 2024-12-12 ENCOUNTER — TELEPHONE (OUTPATIENT)
Dept: FAMILY MEDICINE | Facility: CLINIC | Age: 24
End: 2024-12-12
Payer: COMMERCIAL

## 2024-12-12 NOTE — LETTER
December 12, 2024    To  Gemini Serna  [address] [facility] [address]    Your team at [facility] cares about your health. We have reviewed your chart and based on our findings; we are making the following recommendations to better manage your health.     You are in particular need of attention regarding the following:     Schedule a primary care office visit with your provider for a Pap Smear to screen for Cervical Cancer.  PREVENTATIVE VISIT: Physical    If you have already completed these items, please contact the clinic via phone or   [employer] so your care team can review and update your records. Thank you for   choosing [facility]ics for your healthcare needs. For any questions,   concerns, or to schedule an appointment please contact our clinic.    Healthy Regards,      Your [facility] Care Team

## 2024-12-12 NOTE — TELEPHONE ENCOUNTER
Patient Quality Outreach    Patient is due for the following:   Cervical Cancer Screening - PAP Needed  Physical Preventive Adult Physical  Chlamydia Screening    Action(s) Taken:   Schedule a Adult Preventative    Type of outreach:    Sent letter.    Questions for provider review:    None           Alesia Ibrahim, CMA

## 2024-12-13 ENCOUNTER — TRANSFERRED RECORDS (OUTPATIENT)
Dept: HEALTH INFORMATION MANAGEMENT | Facility: CLINIC | Age: 24
End: 2024-12-13
Payer: COMMERCIAL

## 2024-12-17 ENCOUNTER — THERAPY VISIT (OUTPATIENT)
Dept: PHYSICAL THERAPY | Facility: CLINIC | Age: 24
End: 2024-12-17
Attending: ORTHOPAEDIC SURGERY
Payer: COMMERCIAL

## 2024-12-17 DIAGNOSIS — M41.125 ADOLESCENT IDIOPATHIC SCOLIOSIS OF THORACOLUMBAR REGION: Primary | ICD-10-CM

## 2024-12-17 PROCEDURE — 97110 THERAPEUTIC EXERCISES: CPT | Mod: GP | Performed by: PHYSICAL THERAPIST

## 2025-01-22 ENCOUNTER — THERAPY VISIT (OUTPATIENT)
Dept: PHYSICAL THERAPY | Facility: CLINIC | Age: 25
End: 2025-01-22
Attending: ORTHOPAEDIC SURGERY
Payer: COMMERCIAL

## 2025-01-22 DIAGNOSIS — M41.125 ADOLESCENT IDIOPATHIC SCOLIOSIS OF THORACOLUMBAR REGION: Primary | ICD-10-CM

## 2025-01-22 PROCEDURE — 97110 THERAPEUTIC EXERCISES: CPT | Mod: GP | Performed by: PHYSICAL THERAPIST

## 2025-01-22 PROCEDURE — 97140 MANUAL THERAPY 1/> REGIONS: CPT | Mod: GP | Performed by: PHYSICAL THERAPIST

## 2025-01-22 NOTE — PROGRESS NOTES
"   01/22/25 0500   Appointment Info   Signing clinician's name / credentials Ly Hawkinsjonathanservando, PT   Visits Used 5   Medical Diagnosis Adolescent idiopathic scoliosis of thoracolumbar region   PT Tx Diagnosis back pain   Progress Note/Certification   Therapy Frequency 1X / 2 weeks X 5 visits   Predicted Duration 5 visits in 10 weeks   GOALS   PT Goals 2;3;4   PT Goal 1   Goal Identifier 1.   Goal Description Pt will be able to run errands for 1 to 1.5 hours  w/ soreness no > 2/10   Goal Progress 12/17/24  3/10.  1/22/25 up to 3/10   Target Date 02/26/25   PT Goal 2   Goal Identifier 2.   Goal Description Pt will be able increased tolerance to ADL's / IADL's w/ pain no > 2/10   Goal Progress 12/17/24 2/10.  1/22/25 2/10   Target Date 01/31/25   Date Met 01/22/25   PT Goal 3   Goal Identifier 3.   Goal Description Pt will be independent and consistent w/HEP to improve strength / manage symptoms   Goal Progress 1/22/25 progressing well   Target Date 04/02/25   PT Goal 4   Goal Identifier 4.   Goal Description Pt will report minimal fatigue by late afternoon w/ daily tasks   Target Date 04/02/25   Subjective Report   Subjective Report Pt notes overall she is doing pretty well.  Pt notes R shoulder blade feels like it is sticking out and is tight/ sore 5-6/10.  No complaints w/ ex.   Objective Measures    MMT;  shoulder  ER R 4+/5, L  5/5.  B hip ABD 5/5    CROM flex/ ext/ B rot WNL.  LROM flex WNL, ext 40%, SB 75% B .    Tight in B UT/ levator   Treatment Interventions (PT)   Therapeutic Procedure/Exercise   Ther Proc 1 - Details bridge w/ marching X 10 B.    Scap set w/ Boing 30\" R UE (improving control).  Wall push ups X 10 (R scap mild + instability)--Attempted counter push up not given due to lack of control .  Plank on elbows to mat at mid thigh level X 43\".   Skilled Intervention ex to improve strength and mobility to improve tolerance to daily tasks   Patient Response/Progress improving endurnace, cont to have mild " difficulty w/ R scap positioning   Manual Therapy   Manual Therapy 1 - Details MET post rib R T5 and T6.  STM to B UT/ levator/ UB seated w/ pillow for arm support.   Skilled Intervention to improve mobility/ decrease tightness   Patient Response/Progress Pt reported it felt good   Education   Learner/Method Patient;Listening;Reading;Demonstration;Pictures/Video;No Barriers to Learning   Plan   Home program per PTRX; pin to phone   Plan for next session cont 1X/ 10-14 days to progress HEP/  MT prn/ functional lifting/ strengthening         PLAN  Continue therapy per current plan of care.    Beginning/End Dates of Progress Note Reporting Period:   11/22/24  to 01/22/2025    Referring Provider:  Bennett Price

## 2025-02-05 ENCOUNTER — THERAPY VISIT (OUTPATIENT)
Dept: PHYSICAL THERAPY | Facility: CLINIC | Age: 25
End: 2025-02-05
Attending: ORTHOPAEDIC SURGERY
Payer: COMMERCIAL

## 2025-02-05 DIAGNOSIS — M41.125 ADOLESCENT IDIOPATHIC SCOLIOSIS OF THORACOLUMBAR REGION: Primary | ICD-10-CM

## 2025-02-05 PROCEDURE — 97110 THERAPEUTIC EXERCISES: CPT | Mod: GP | Performed by: PHYSICAL THERAPIST

## 2025-02-05 PROCEDURE — 97140 MANUAL THERAPY 1/> REGIONS: CPT | Mod: GP | Performed by: PHYSICAL THERAPIST

## 2025-02-18 ENCOUNTER — THERAPY VISIT (OUTPATIENT)
Dept: PHYSICAL THERAPY | Facility: CLINIC | Age: 25
End: 2025-02-18
Attending: ORTHOPAEDIC SURGERY
Payer: COMMERCIAL

## 2025-02-18 DIAGNOSIS — M41.125 ADOLESCENT IDIOPATHIC SCOLIOSIS OF THORACOLUMBAR REGION: Primary | ICD-10-CM

## 2025-02-18 PROCEDURE — 97110 THERAPEUTIC EXERCISES: CPT | Mod: GP | Performed by: PHYSICAL THERAPIST

## 2025-02-18 PROCEDURE — 97140 MANUAL THERAPY 1/> REGIONS: CPT | Mod: GP | Performed by: PHYSICAL THERAPIST

## 2025-03-17 ENCOUNTER — THERAPY VISIT (OUTPATIENT)
Dept: PHYSICAL THERAPY | Facility: CLINIC | Age: 25
End: 2025-03-17
Attending: ORTHOPAEDIC SURGERY

## 2025-03-17 DIAGNOSIS — M41.125 ADOLESCENT IDIOPATHIC SCOLIOSIS OF THORACOLUMBAR REGION: Primary | ICD-10-CM

## 2025-03-17 PROCEDURE — 97140 MANUAL THERAPY 1/> REGIONS: CPT | Mod: GP | Performed by: PHYSICAL THERAPIST

## 2025-03-17 PROCEDURE — 97110 THERAPEUTIC EXERCISES: CPT | Mod: GP | Performed by: PHYSICAL THERAPIST

## 2025-05-02 ENCOUNTER — ANCILLARY PROCEDURE (OUTPATIENT)
Dept: GENERAL RADIOLOGY | Facility: CLINIC | Age: 25
End: 2025-05-02
Attending: ORTHOPAEDIC SURGERY
Payer: COMMERCIAL

## 2025-05-02 DIAGNOSIS — M41.04 INFANTILE IDIOPATHIC SCOLIOSIS OF THORACIC REGION: ICD-10-CM

## 2025-05-02 PROCEDURE — 72082 X-RAY EXAM ENTIRE SPI 2/3 VW: CPT | Performed by: STUDENT IN AN ORGANIZED HEALTH CARE EDUCATION/TRAINING PROGRAM

## 2025-07-13 ENCOUNTER — HEALTH MAINTENANCE LETTER (OUTPATIENT)
Age: 25
End: 2025-07-13